# Patient Record
Sex: FEMALE | Race: WHITE
[De-identification: names, ages, dates, MRNs, and addresses within clinical notes are randomized per-mention and may not be internally consistent; named-entity substitution may affect disease eponyms.]

---

## 2020-08-25 ENCOUNTER — HOSPITAL ENCOUNTER (OUTPATIENT)
Dept: HOSPITAL 47 - WWCWWP | Age: 25
Discharge: HOME | End: 2020-08-25
Attending: OBSTETRICS & GYNECOLOGY
Payer: COMMERCIAL

## 2020-08-25 VITALS
TEMPERATURE: 98.1 F | DIASTOLIC BLOOD PRESSURE: 83 MMHG | SYSTOLIC BLOOD PRESSURE: 131 MMHG | HEART RATE: 85 BPM | RESPIRATION RATE: 18 BRPM

## 2020-08-25 DIAGNOSIS — N89.8: Primary | ICD-10-CM

## 2020-08-25 DIAGNOSIS — Z11.3: ICD-10-CM

## 2020-08-25 PROCEDURE — 87510 GARDNER VAG DNA DIR PROBE: CPT

## 2020-08-25 PROCEDURE — 87660 TRICHOMONAS VAGIN DIR PROBE: CPT

## 2020-08-25 PROCEDURE — 87480 CANDIDA DNA DIR PROBE: CPT

## 2020-08-25 PROCEDURE — 87591 N.GONORRHOEAE DNA AMP PROB: CPT

## 2020-08-25 PROCEDURE — 87491 CHLMYD TRACH DNA AMP PROBE: CPT

## 2020-08-25 NOTE — P.HPOB
History of Present Illness


H&P Date: 08/25/20


Chief Complaint: The patient is here for her routine gynecologic exam and birth 

control.


This is a 24-year-old G0 with an LMP of 08/17/2020 who is here to establish with

this office.  The patient was started on birth control pills because of heavy 

menstrual periods and crampy menstrual periods at age 19.  She states she has 

done very well with birth control pills and menstrual periods have been much 

better on them.  She has been sexually active since age 20 and has had 2 sexual 

partners.  She would like to continue on with oral contraception and has no 

immediate plans to become pregnant.








Review of Systems


The patient's weight has been stable over the last year.  She denies 

respiratory, cardiac, or G.I. problems.








Past Medical History


Past Medical History: No Reported History


Additional Past Medical History / Comment(s): PAST GYN HISTORY: She has no 

history of STDs.


History of Any Multi-Drug Resistant Organisms: None Reported


Additional Past Surgical History / Comment(s): [place] teeth removed.


Past Anesthesia/Blood Transfusion Reactions: No Reported Reaction


Past Psychological History: No Psychological Hx Reported


Smoking Status: Never smoker


Past Alcohol Use History: Occasional (2 per week)


Past Drug Use History: None Reported


Additional History: And has been with her boyfriend since 2019 and does not live

with him.  She has had 2 sexual partners in her lifetime. 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





- Past Family History


  ** Father


Family Medical History: Hyperlipidemia


Additional Family Medical History / Comment(s): Paternal grandfather had an MI 

and paternal grandmother had lung cancer.





  ** Mother


Family Medical History: Thyroid Disorder


Additional Family Medical History / Comment(s): Hypothyroid.  Maternal great 

aunt had breast cancer.





Medications and Allergies


                                Home Medications











 Medication  Instructions  Recorded  Confirmed  Type


 


Norgestimate-Ethinyl Estradiol 1 each PO DAILY 08/25/20 08/25/20 History





[Tri-Sprintec Tablet]    








                                    Allergies











Allergy/AdvReac Type Severity Reaction Status Date / Time


 


No Known Allergies Allergy   Unverified 08/25/20 08:48














Exam


                                   Vital Signs











  Temp Pulse Resp BP Pulse Ox


 


 08/25/20 08:50  98.1 F  85  18  131/83  100








                                Intake and Output











 08/24/20 08/25/20 08/25/20





 22:59 06:59 14:59


 


Other:   


 


  Weight   54.431 kg











Height 5 feet 1-1/2 inches, weight 120 pounds, BMI 22.3.





This is a well-developed well-nourished white female who is alert and oriented 

times 3 in no acute distress.


HEENT: Within normal limits.


NECK: Supple without mass or thyromegaly.


CHEST AND LUNGS: Clear to auscultation.


HEART: Regular rate and rhythm.


BREASTS: Are without mass or discharge.


AXILLARY EXAM: Negative for adenopathy.


BACK: Negative for CVA tenderness.


ABDOMEN: Soft, nontender, without palpable masses.


PELVIC EXAM: Normal external genitalia. Cervix and vagina appear normal. There 

is a small amount of creamy white discharge without odor.  There is no cervical 

motion tenderness.  There is no evidence of prolapse.  The uterus is 

midposition, nongravid size and nontender. There are no palpable adnexal masses 

or tenderness.


RECTAL EXAM: Deferred.


EXTREMITIES: Nontender.





IMPRESSION: 


1.  24-year-old female with slight creamy white discharge which was noted on 

exam.  This is asymptomatic and she has an otherwise unremarkable gynecologic 

exam.


2.  Doing well on oral contraception which has been used for birth control and 

for dysmenorrhea and mild hypermenorrhea.





PLAN: 


1.  Pap smear was performed.  This will be cytology only with no reflex testing.


2.  Self breast awareness was discussed with the patient.


3.  STD prevention was discussed.  I have stressed the importance of limiting 

sexual partners and we have also discussed how condom use can be helpful in 

preventing STDs.


4.  We have discussed the HPV vaccination and she understands that she can get 

this through the health department.


5.  GC and chlamydia screening was obtained from the cervix.


6.  Affirm testing for Candida, Gardnerella, and Trichomonas was obtained from 

the vagina.


7.  Continue Tri-Sprintec oral contraception.  The prescription will be sent 

electronically to [facility] (formerly [facility] pharmacy on [facility] and [address]


8. She was advised to return in one year for her annual well woman exam.